# Patient Record
Sex: FEMALE | Race: WHITE | NOT HISPANIC OR LATINO | Employment: FULL TIME | URBAN - METROPOLITAN AREA
[De-identification: names, ages, dates, MRNs, and addresses within clinical notes are randomized per-mention and may not be internally consistent; named-entity substitution may affect disease eponyms.]

---

## 2018-11-26 ENCOUNTER — TELEPHONE (OUTPATIENT)
Dept: OBSTETRICS AND GYNECOLOGY | Facility: CLINIC | Age: 43
End: 2018-11-26

## 2018-11-26 NOTE — TELEPHONE ENCOUNTER
Hello, this is Prabhjot calling from the OBGYN clinic at [facility]. Returning a call to the clinic. (No answer, unable to leave VM message. Busy tone.)

## 2018-11-26 NOTE — TELEPHONE ENCOUNTER
----- Message from Zahra Silverio sent at 11/26/2018  2:54 PM CST -----  Contact: pt   Name of Who is Calling: ARELI HERNANDEZ [[id number]]       What is the request in detail: patient is requesting a call in regards to getting her annual exam and MMG orders.....Please contact to further discuss and advise.       Can the clinic reply by [facility]: no       What Number to Call Back if not in [facility]: [phone]

## 2018-11-27 ENCOUNTER — TELEPHONE (OUTPATIENT)
Dept: OBSTETRICS AND GYNECOLOGY | Facility: CLINIC | Age: 43
End: 2018-11-27

## 2018-11-27 NOTE — TELEPHONE ENCOUNTER
----- Message from Kathy Kothari sent at 11/27/2018 12:55 PM CST -----  Contact: pt            Name of Who is Calling: pt      What is the request in detail: is returning a call      Can the clinic reply by [facility]: no      What [facility]: [phone]

## 2018-11-27 NOTE — TELEPHONE ENCOUNTER
Hello, this is Prabhjot calling from the OBGYN clinic at [facility]. Returning a call. (No answer, unable to leave VM message. Busy tone.)

## 2018-11-28 ENCOUNTER — TELEPHONE (OUTPATIENT)
Dept: OBSTETRICS AND GYNECOLOGY | Facility: CLINIC | Age: 43
End: 2018-11-28

## 2018-11-28 NOTE — TELEPHONE ENCOUNTER
----- Message from Darryl Chacon sent at 11/28/2018  9:39 AM CST -----  Contact: ARELI HERNANDEZ [[id number]]    Name of Who is Calling: ARELI HERNANDEZ [[id number]]      What is the request in detail: Patient returning a call in regards to an appointment      Can the clinic reply by [facility]: no      What Number to Call Back if not in [facility]: [phone]

## 2018-11-28 NOTE — TELEPHONE ENCOUNTER
Returned call to the patient. Pt requesting an appointment for her annual exam on 12/21. Informed the patient that Dr. Yoder doesn't have any available appointments until January and that her January calender hasn't been released yet. Offered to add the patient to the wait list for a cancellation and reminder list to be contacted when the January calender is released. Also offered the patient a sooner appointment with one of Dr. Yoder's colleagues or the NP. Patient declined and agreed to be added to the wait list and reminder list. Patient verbalized understanding.

## 2018-12-04 ENCOUNTER — TELEPHONE (OUTPATIENT)
Dept: OBSTETRICS AND GYNECOLOGY | Facility: CLINIC | Age: 43
End: 2018-12-04

## 2018-12-04 NOTE — TELEPHONE ENCOUNTER
Attempted to contact the patient to schedule her wwe in January. No answer, busy tone. Unable to leave VM message for the patient to call the clinic back.

## 2019-01-02 ENCOUNTER — TELEPHONE (OUTPATIENT)
Dept: OBSTETRICS AND GYNECOLOGY | Facility: CLINIC | Age: 44
End: 2019-01-02

## 2019-01-02 NOTE — TELEPHONE ENCOUNTER
Returned the patient's call to the clinic. Patient agreed to an appointment on 2/1 at 1PM. Patient verbalized understanding.

## 2019-01-02 NOTE — TELEPHONE ENCOUNTER
----- Message from Gayla Martinez sent at 1/2/2019  1:45 PM CST -----  Contact: pt            Name of Who is Calling: ARELI HERNANDEZ [[id number]]      What is the request in detail:pt calling in regards to appt availability.. Please advise...      Can the clinic reply by [facility]: no      [facility] [phone]

## 2019-02-01 ENCOUNTER — OFFICE VISIT (OUTPATIENT)
Dept: OBSTETRICS AND GYNECOLOGY | Facility: CLINIC | Age: 44
End: 2019-02-01
Attending: OBSTETRICS & GYNECOLOGY
Payer: COMMERCIAL

## 2019-02-01 VITALS
DIASTOLIC BLOOD PRESSURE: 78 MMHG | WEIGHT: 140.44 LBS | SYSTOLIC BLOOD PRESSURE: 130 MMHG | BODY MASS INDEX: 24.88 KG/M2 | HEIGHT: 63 IN

## 2019-02-01 DIAGNOSIS — Z01.419 VISIT FOR GYNECOLOGIC EXAMINATION: Primary | ICD-10-CM

## 2019-02-01 DIAGNOSIS — Z12.31 ENCOUNTER FOR SCREENING MAMMOGRAM FOR BREAST CANCER: ICD-10-CM

## 2019-02-01 PROCEDURE — 87624 HPV HI-RISK TYP POOLED RSLT: CPT

## 2019-02-01 PROCEDURE — 99999 PR PBB SHADOW E&M-EST. PATIENT-LVL III: CPT | Mod: PBBFAC,,, | Performed by: OBSTETRICS & GYNECOLOGY

## 2019-02-01 PROCEDURE — 99396 PR PREVENTIVE VISIT,EST,40-64: ICD-10-PCS | Mod: S$GLB,,, | Performed by: OBSTETRICS & GYNECOLOGY

## 2019-02-01 PROCEDURE — 99999 PR PBB SHADOW E&M-EST. PATIENT-LVL III: ICD-10-PCS | Mod: PBBFAC,,, | Performed by: OBSTETRICS & GYNECOLOGY

## 2019-02-01 PROCEDURE — 88175 CYTOPATH C/V AUTO FLUID REDO: CPT

## 2019-02-01 PROCEDURE — 99396 PREV VISIT EST AGE 40-64: CPT | Mod: S$GLB,,, | Performed by: OBSTETRICS & GYNECOLOGY

## 2019-02-01 RX ORDER — CHOLECALCIFEROL (VITAMIN D3) 25 MCG
1000 TABLET ORAL DAILY
COMMUNITY

## 2019-02-01 RX ORDER — MULTIVITAMIN
1 TABLET ORAL DAILY
COMMUNITY

## 2019-02-01 NOTE — PROGRESS NOTES
"CC: Well woman exam    Clara Callaway is a 44 y.o. female  presents for a well woman exam.  She has no issues, problems, or complaints.    Cycles have recently become a little irregular - missing some cycles.    Past Medical History:   Diagnosis Date    Anemia     in past    Fibroids     Ovarian cyst        Past Surgical History:   Procedure Laterality Date    ECTOPIC PREGNANCY SURGERY      MYOMECTOMY  12/15/2015    ROBOTIC ASSISTED LAPAROSCOPIC MYOMECTOMY N/A 12/15/2015    Performed by Tereza Yoder MD at [facility], operated by [facility] OR       OB History    Para Term  AB Living   1       1     SAB TAB Ectopic Multiple Live Births       1          # Outcome Date GA Lbr Suresh/2nd Weight Sex Delivery Anes PTL Lv   1 Ectopic                   Family History   Problem Relation Age of Onset    Ovarian cancer Sister 26        BRCA 1/2 gene mutation negative    Diabetes Maternal Grandmother     Hypertension Maternal Grandmother     Breast cancer Neg Hx     Colon cancer Neg Hx     Stroke Neg Hx        Social History     Tobacco Use    Smoking status: Never Smoker    Smokeless tobacco: Never Used   Substance Use Topics    Alcohol use: Yes     Comment: occasional    Drug use: No       /78 (BP Location: Left arm, Patient Position: Sitting, BP Method: Large (Manual))   Ht 5' 3" (1.6 m)   Wt 63.7 kg (140 lb 6.9 oz)   LMP 2019 (Exact Date)   BMI 24.88 kg/m²     ROS:  GENERAL: Denies weight gain or weight loss. Feeling well overall.   SKIN: Denies rash or lesions.   HEAD: Denies head injury or headache.   NODES: Denies enlarged lymph nodes.   CHEST: Denies chest pain or shortness of breath.   CARDIOVASCULAR: Denies palpitations or left sided chest pain.   ABDOMEN: No abdominal pain, constipation, diarrhea, nausea, vomiting or rectal bleeding.   URINARY: No frequency, dysuria, hematuria, or burning on urination.  REPRODUCTIVE: See HPI.   BREASTS: The patient performs breast " self-examination and denies pain, lumps, or nipple discharge.   HEMATOLOGIC: No easy bruisability or excessive bleeding.  MUSCULOSKELETAL: Denies joint pain or swelling.   NEUROLOGIC: Denies syncope or weakness.   PSYCHIATRIC: Denies depression, anxiety or mood swings.    Physical Exam:    APPEARANCE: Well nourished, well developed, in no acute distress.  AFFECT: WNL, alert and oriented x 3  SKIN: No acne or hirsutism  NECK: Neck symmetric without masses or thyromegaly  NODES: No inguinal, cervical, axillary, or femoral lymph node enlargement  CHEST: Good respiratory effect  ABDOMEN: Soft.  No tenderness or masses.  No hepatosplenomegaly.  No hernias.  BREASTS: Symmetrical, no skin changes or visible lesions.  No palpable masses, nipple discharge bilaterally.  PELVIC: Normal external genitalia without lesions.  Normal hair distribution.  Adequate perineal body, normal urethral meatus.  Vagina moist and well rugated without lesions or discharge.  Cervix pink, without lesions, discharge or tenderness.  No significant cystocele or rectocele.  Bimanual exam shows uterus to be normal size, regular, mobile and nontender.  Adnexa without masses or tenderness.    EXTREMITIES: No edema.    ASSESSMENT AND PLAN  1. Visit for gynecologic examination  Liquid-based pap smear, screening    HPV High Risk Genotypes, PCR   2. Encounter for screening mammogram for breast cancer  Mammo Digital Screening Bilat    Mammo Digital Screening Bilat    CANCELED: Mammo Digital Screening Bilat       Patient was counseled today on A.C.S. Pap guidelines and recommendations for yearly pelvic exams, pap smears every 5 years with HPV co-testing, mammograms and monthly self breast exams; to see her PCP for other health maintenance.     Discussed that she is most likely perimenopausal but to contact the office if it continues or worsens.    Follow-up in about 1 year (around 2/1/2020).

## 2019-02-06 LAB
HPV HR 12 DNA CVX QL NAA+PROBE: NEGATIVE
HPV16 AG SPEC QL: NEGATIVE
HPV18 DNA SPEC QL NAA+PROBE: NEGATIVE

## 2019-02-07 ENCOUNTER — TELEPHONE (OUTPATIENT)
Dept: OBSTETRICS AND GYNECOLOGY | Facility: CLINIC | Age: 44
End: 2019-02-07

## 2019-02-12 ENCOUNTER — TELEPHONE (OUTPATIENT)
Dept: OBSTETRICS AND GYNECOLOGY | Facility: CLINIC | Age: 44
End: 2019-02-12

## 2019-02-12 NOTE — TELEPHONE ENCOUNTER
Attempted to contact the patient to inform her that her pap results are normal. No answer, unable to leave voicemail message for the patient to call the clinic back.

## 2020-01-10 ENCOUNTER — TELEPHONE (OUTPATIENT)
Dept: OBSTETRICS AND GYNECOLOGY | Facility: CLINIC | Age: 45
End: 2020-01-10

## 2020-01-10 NOTE — TELEPHONE ENCOUNTER
----- Message from Rubin Bran, Patient Care Assistant sent at 1/10/2020  4:03 PM CST -----  Contact: ARELI HERNANDEZ [[id number]]  Name of Who is Calling: ARELI HERNANDEZ [[id number]]    What is the request in detail: Requesting a call back in regards of scheduling appointment for frequent cycles and pain in lower back. Please contact to further discuss and advise      Can the clinic reply by [facility]: No    What [facility]:   [phone]